# Patient Record
Sex: FEMALE | Race: ASIAN | NOT HISPANIC OR LATINO
[De-identification: names, ages, dates, MRNs, and addresses within clinical notes are randomized per-mention and may not be internally consistent; named-entity substitution may affect disease eponyms.]

---

## 2017-05-23 ENCOUNTER — APPOINTMENT (OUTPATIENT)
Dept: INTERNAL MEDICINE | Facility: CLINIC | Age: 42
End: 2017-05-23

## 2017-05-23 VITALS
HEART RATE: 75 BPM | BODY MASS INDEX: 26.8 KG/M2 | TEMPERATURE: 98.3 F | DIASTOLIC BLOOD PRESSURE: 70 MMHG | OXYGEN SATURATION: 99 % | WEIGHT: 157 LBS | SYSTOLIC BLOOD PRESSURE: 115 MMHG | HEIGHT: 64 IN

## 2017-05-23 DIAGNOSIS — O02.1 MISSED ABORTION: ICD-10-CM

## 2017-05-23 DIAGNOSIS — Z01.419 ENCOUNTER FOR GYNECOLOGICAL EXAMINATION (GENERAL) (ROUTINE) W/OUT ABNORMAL FINDINGS: ICD-10-CM

## 2017-05-24 LAB
ALBUMIN SERPL ELPH-MCNC: 4.4 G/DL
ALP BLD-CCNC: 50 U/L
ALT SERPL-CCNC: 20 U/L
ANION GAP SERPL CALC-SCNC: 13 MMOL/L
AST SERPL-CCNC: 15 U/L
BASOPHILS # BLD AUTO: 0.02 K/UL
BASOPHILS NFR BLD AUTO: 0.2 %
BILIRUB SERPL-MCNC: 0.9 MG/DL
BUN SERPL-MCNC: 16 MG/DL
CALCIUM SERPL-MCNC: 9.7 MG/DL
CHLORIDE SERPL-SCNC: 101 MMOL/L
CHOLEST SERPL-MCNC: 200 MG/DL
CHOLEST/HDLC SERPL: 3.4 RATIO
CO2 SERPL-SCNC: 24 MMOL/L
CREAT SERPL-MCNC: 0.72 MG/DL
EOSINOPHIL # BLD AUTO: 0.27 K/UL
EOSINOPHIL NFR BLD AUTO: 3.2 %
GLUCOSE SERPL-MCNC: 96 MG/DL
HBA1C MFR BLD HPLC: 5.3 %
HCT VFR BLD CALC: 38.8 %
HDLC SERPL-MCNC: 58 MG/DL
HGB BLD-MCNC: 12.4 G/DL
IMM GRANULOCYTES NFR BLD AUTO: 0.2 %
LDLC SERPL CALC-MCNC: 120 MG/DL
LYMPHOCYTES # BLD AUTO: 2.1 K/UL
LYMPHOCYTES NFR BLD AUTO: 25.1 %
MAN DIFF?: NORMAL
MCHC RBC-ENTMCNC: 25.6 PG
MCHC RBC-ENTMCNC: 32 GM/DL
MCV RBC AUTO: 80 FL
MONOCYTES # BLD AUTO: 0.35 K/UL
MONOCYTES NFR BLD AUTO: 4.2 %
NEUTROPHILS # BLD AUTO: 5.59 K/UL
NEUTROPHILS NFR BLD AUTO: 67.1 %
PLATELET # BLD AUTO: 274 K/UL
POTASSIUM SERPL-SCNC: 4.6 MMOL/L
PROT SERPL-MCNC: 7.7 G/DL
RBC # BLD: 4.85 M/UL
RBC # FLD: 12.9 %
SODIUM SERPL-SCNC: 138 MMOL/L
TRIGL SERPL-MCNC: 110 MG/DL
TSH SERPL-ACNC: 1.43 UIU/ML
WBC # FLD AUTO: 8.35 K/UL

## 2017-05-29 ENCOUNTER — FORM ENCOUNTER (OUTPATIENT)
Age: 42
End: 2017-05-29

## 2017-05-30 ENCOUNTER — APPOINTMENT (OUTPATIENT)
Dept: MAMMOGRAPHY | Facility: IMAGING CENTER | Age: 42
End: 2017-05-30

## 2017-05-30 ENCOUNTER — OUTPATIENT (OUTPATIENT)
Dept: OUTPATIENT SERVICES | Facility: HOSPITAL | Age: 42
LOS: 1 days | End: 2017-05-30
Payer: COMMERCIAL

## 2017-05-30 DIAGNOSIS — Z00.00 ENCOUNTER FOR GENERAL ADULT MEDICAL EXAMINATION WITHOUT ABNORMAL FINDINGS: ICD-10-CM

## 2017-05-30 PROCEDURE — 77067 SCR MAMMO BI INCL CAD: CPT

## 2017-05-30 PROCEDURE — 77063 BREAST TOMOSYNTHESIS BI: CPT

## 2019-10-22 ENCOUNTER — OTHER (OUTPATIENT)
Age: 44
End: 2019-10-22

## 2019-11-05 ENCOUNTER — FORM ENCOUNTER (OUTPATIENT)
Age: 44
End: 2019-11-05

## 2019-11-06 ENCOUNTER — OUTPATIENT (OUTPATIENT)
Dept: OUTPATIENT SERVICES | Facility: HOSPITAL | Age: 44
LOS: 1 days | End: 2019-11-06
Payer: COMMERCIAL

## 2019-11-06 ENCOUNTER — APPOINTMENT (OUTPATIENT)
Dept: MAMMOGRAPHY | Facility: IMAGING CENTER | Age: 44
End: 2019-11-06
Payer: COMMERCIAL

## 2019-11-06 DIAGNOSIS — Z00.00 ENCOUNTER FOR GENERAL ADULT MEDICAL EXAMINATION WITHOUT ABNORMAL FINDINGS: ICD-10-CM

## 2019-11-06 PROCEDURE — 77063 BREAST TOMOSYNTHESIS BI: CPT | Mod: 26

## 2019-11-06 PROCEDURE — 77063 BREAST TOMOSYNTHESIS BI: CPT

## 2019-11-06 PROCEDURE — 77067 SCR MAMMO BI INCL CAD: CPT | Mod: 26

## 2019-11-06 PROCEDURE — 77067 SCR MAMMO BI INCL CAD: CPT

## 2020-01-13 ENCOUNTER — APPOINTMENT (OUTPATIENT)
Dept: OBGYN | Facility: CLINIC | Age: 45
End: 2020-01-13
Payer: COMMERCIAL

## 2020-01-13 VITALS
DIASTOLIC BLOOD PRESSURE: 90 MMHG | HEART RATE: 87 BPM | WEIGHT: 167 LBS | BODY MASS INDEX: 28.51 KG/M2 | HEIGHT: 64 IN | SYSTOLIC BLOOD PRESSURE: 123 MMHG

## 2020-01-13 VITALS — SYSTOLIC BLOOD PRESSURE: 122 MMHG | DIASTOLIC BLOOD PRESSURE: 81 MMHG

## 2020-01-13 DIAGNOSIS — Z01.419 ENCOUNTER FOR GYNECOLOGICAL EXAMINATION (GENERAL) (ROUTINE) W/OUT ABNORMAL FINDINGS: ICD-10-CM

## 2020-01-13 PROCEDURE — 99386 PREV VISIT NEW AGE 40-64: CPT

## 2020-02-01 LAB
C TRACH RRNA SPEC QL NAA+PROBE: NOT DETECTED
CYTOLOGY CVX/VAG DOC THIN PREP: NORMAL
HPV HIGH+LOW RISK DNA PNL CVX: NOT DETECTED
N GONORRHOEA RRNA SPEC QL NAA+PROBE: NOT DETECTED
SOURCE AMPLIFICATION: NORMAL

## 2020-03-07 NOTE — PHYSICAL EXAM
[Awake] : awake [Alert] : alert [Soft] : soft [Oriented x3] : oriented to person, place, and time [Labia Majora] : labia major [Labia Minora] : labia minora [Normal] : clitoris [No Bleeding] : there was no active vaginal bleeding [Pap Obtained] : a Pap smear was performed [Uterine Adnexae] : were not tender and not enlarged [No Tenderness] : no rectal tenderness [Nl Sphincter Tone] : normal sphincter tone [RRR, No Murmurs] : RRR, no murmurs [CTAB] : CTAB [Acute Distress] : no acute distress [LAD] : no lymphadenopathy [Thyroid Nodule] : no thyroid nodule [Goiter] : no goiter [Mass] : no breast mass [Nipple Discharge] : no nipple discharge [Axillary LAD] : no axillary lymphadenopathy [Tender] : non tender [Distended] : not distended [H/Smegaly] : no hepatosplenomegaly [Depressed Mood] : not depressed [Flat Affect] : affect not flat [FreeTextEntry5] : HPV and cervical cultures obtained. [FreeTextEntry9] : Confirms vaginal exam.

## 2020-04-25 ENCOUNTER — MESSAGE (OUTPATIENT)
Age: 45
End: 2020-04-25

## 2020-05-04 LAB
SARS-COV-2 IGG SERPL IA-ACNC: <0.1 INDEX
SARS-COV-2 IGG SERPL QL IA: NEGATIVE

## 2020-05-05 ENCOUNTER — APPOINTMENT (OUTPATIENT)
Dept: OBGYN | Facility: CLINIC | Age: 45
End: 2020-05-05

## 2021-02-06 ENCOUNTER — RESULT REVIEW (OUTPATIENT)
Age: 46
End: 2021-02-06

## 2021-05-05 ENCOUNTER — APPOINTMENT (OUTPATIENT)
Dept: INTERNAL MEDICINE | Facility: CLINIC | Age: 46
End: 2021-05-05
Payer: COMMERCIAL

## 2021-05-05 VITALS
SYSTOLIC BLOOD PRESSURE: 115 MMHG | OXYGEN SATURATION: 98 % | HEART RATE: 86 BPM | HEIGHT: 64 IN | TEMPERATURE: 98.6 F | BODY MASS INDEX: 28.34 KG/M2 | WEIGHT: 166 LBS | DIASTOLIC BLOOD PRESSURE: 80 MMHG

## 2021-05-05 PROCEDURE — 99396 PREV VISIT EST AGE 40-64: CPT | Mod: 25

## 2021-05-05 PROCEDURE — 99072 ADDL SUPL MATRL&STAF TM PHE: CPT

## 2021-05-05 PROCEDURE — 36415 COLL VENOUS BLD VENIPUNCTURE: CPT

## 2021-05-05 NOTE — PHYSICAL EXAM
[No Acute Distress] : no acute distress [Well-Appearing] : well-appearing [Normal Sclera/Conjunctiva] : normal sclera/conjunctiva [No Lymphadenopathy] : no lymphadenopathy [Supple] : supple [No Respiratory Distress] : no respiratory distress  [Clear to Auscultation] : lungs were clear to auscultation bilaterally [Normal Rate] : normal rate  [Regular Rhythm] : with a regular rhythm [Normal S1, S2] : normal S1 and S2 [No Murmur] : no murmur heard [No Edema] : there was no peripheral edema [Soft] : abdomen soft [Non Tender] : non-tender [Normal Supraclavicular Nodes] : no supraclavicular lymphadenopathy [Normal Anterior Cervical Nodes] : no anterior cervical lymphadenopathy [Normal Affect] : the affect was normal [Normal Mood] : the mood was normal

## 2021-05-05 NOTE — HISTORY OF PRESENT ILLNESS
[de-identified] : 44 y/o female for CPE, last seen here in 2017.  \par \par Overall healthy and without complaints.  \par \par \par HCM:\par PAP Jan 2020\par Mammo 11/2019\par COVID vaccines completed

## 2021-05-05 NOTE — ASSESSMENT
[FreeTextEntry1] : Healthy 46 y/o female\par \par \par \par HCM:\par PAP Jan 2020\par Mammo 11/2019 DUE, ordered\par COVID vaccines completed\par Metabolic screening labs ordered

## 2021-05-06 LAB
ALBUMIN SERPL ELPH-MCNC: 4.5 G/DL
ALP BLD-CCNC: 62 U/L
ALT SERPL-CCNC: 12 U/L
ANION GAP SERPL CALC-SCNC: 10 MMOL/L
AST SERPL-CCNC: 17 U/L
BASOPHILS # BLD AUTO: 0.03 K/UL
BASOPHILS NFR BLD AUTO: 0.4 %
BILIRUB SERPL-MCNC: 0.5 MG/DL
BUN SERPL-MCNC: 16 MG/DL
CALCIUM SERPL-MCNC: 9.4 MG/DL
CHLORIDE SERPL-SCNC: 107 MMOL/L
CHOLEST SERPL-MCNC: 202 MG/DL
CO2 SERPL-SCNC: 24 MMOL/L
CREAT SERPL-MCNC: 0.86 MG/DL
EOSINOPHIL # BLD AUTO: 0.21 K/UL
EOSINOPHIL NFR BLD AUTO: 3.1 %
ESTIMATED AVERAGE GLUCOSE: 103 MG/DL
GLUCOSE SERPL-MCNC: 95 MG/DL
HBA1C MFR BLD HPLC: 5.2 %
HCT VFR BLD CALC: 38 %
HDLC SERPL-MCNC: 56 MG/DL
HGB BLD-MCNC: 12.2 G/DL
IMM GRANULOCYTES NFR BLD AUTO: 0.3 %
LDLC SERPL CALC-MCNC: 130 MG/DL
LYMPHOCYTES # BLD AUTO: 1.86 K/UL
LYMPHOCYTES NFR BLD AUTO: 27.3 %
MAN DIFF?: NORMAL
MCHC RBC-ENTMCNC: 26.5 PG
MCHC RBC-ENTMCNC: 32.1 GM/DL
MCV RBC AUTO: 82.4 FL
MONOCYTES # BLD AUTO: 0.39 K/UL
MONOCYTES NFR BLD AUTO: 5.7 %
NEUTROPHILS # BLD AUTO: 4.31 K/UL
NEUTROPHILS NFR BLD AUTO: 63.2 %
NONHDLC SERPL-MCNC: 147 MG/DL
PLATELET # BLD AUTO: 269 K/UL
POTASSIUM SERPL-SCNC: 4.4 MMOL/L
PROT SERPL-MCNC: 7.1 G/DL
RBC # BLD: 4.61 M/UL
RBC # FLD: 12.8 %
SODIUM SERPL-SCNC: 141 MMOL/L
TRIGL SERPL-MCNC: 82 MG/DL
TSH SERPL-ACNC: 1.07 UIU/ML
WBC # FLD AUTO: 6.82 K/UL

## 2021-07-06 ENCOUNTER — LABORATORY RESULT (OUTPATIENT)
Age: 46
End: 2021-07-06

## 2021-11-17 ENCOUNTER — APPOINTMENT (OUTPATIENT)
Dept: MAMMOGRAPHY | Facility: IMAGING CENTER | Age: 46
End: 2021-11-17
Payer: COMMERCIAL

## 2021-11-17 ENCOUNTER — APPOINTMENT (OUTPATIENT)
Dept: ULTRASOUND IMAGING | Facility: IMAGING CENTER | Age: 46
End: 2021-11-17
Payer: COMMERCIAL

## 2021-11-17 ENCOUNTER — OUTPATIENT (OUTPATIENT)
Dept: OUTPATIENT SERVICES | Facility: HOSPITAL | Age: 46
LOS: 1 days | End: 2021-11-17
Payer: COMMERCIAL

## 2021-11-17 ENCOUNTER — RESULT REVIEW (OUTPATIENT)
Age: 46
End: 2021-11-17

## 2021-11-17 ENCOUNTER — APPOINTMENT (OUTPATIENT)
Dept: ULTRASOUND IMAGING | Facility: IMAGING CENTER | Age: 46
End: 2021-11-17

## 2021-11-17 ENCOUNTER — APPOINTMENT (OUTPATIENT)
Dept: MAMMOGRAPHY | Facility: IMAGING CENTER | Age: 46
End: 2021-11-17

## 2021-11-17 DIAGNOSIS — Z00.00 ENCOUNTER FOR GENERAL ADULT MEDICAL EXAMINATION WITHOUT ABNORMAL FINDINGS: ICD-10-CM

## 2021-11-17 DIAGNOSIS — Z00.8 ENCOUNTER FOR OTHER GENERAL EXAMINATION: ICD-10-CM

## 2021-11-17 PROCEDURE — 77063 BREAST TOMOSYNTHESIS BI: CPT | Mod: 26

## 2021-11-17 PROCEDURE — 77063 BREAST TOMOSYNTHESIS BI: CPT

## 2021-11-17 PROCEDURE — 77067 SCR MAMMO BI INCL CAD: CPT | Mod: 26

## 2021-11-17 PROCEDURE — 77067 SCR MAMMO BI INCL CAD: CPT

## 2021-12-18 ENCOUNTER — OUTPATIENT (OUTPATIENT)
Dept: OUTPATIENT SERVICES | Facility: HOSPITAL | Age: 46
LOS: 1 days | End: 2021-12-18

## 2021-12-18 DIAGNOSIS — U07.1 COVID-19: ICD-10-CM

## 2022-11-16 ENCOUNTER — APPOINTMENT (OUTPATIENT)
Dept: MAMMOGRAPHY | Facility: IMAGING CENTER | Age: 47
End: 2022-11-16

## 2022-11-16 ENCOUNTER — APPOINTMENT (OUTPATIENT)
Dept: ULTRASOUND IMAGING | Facility: IMAGING CENTER | Age: 47
End: 2022-11-16

## 2022-11-16 ENCOUNTER — RESULT REVIEW (OUTPATIENT)
Age: 47
End: 2022-11-16

## 2022-11-16 ENCOUNTER — OUTPATIENT (OUTPATIENT)
Dept: OUTPATIENT SERVICES | Facility: HOSPITAL | Age: 47
LOS: 1 days | End: 2022-11-16
Payer: COMMERCIAL

## 2022-11-16 DIAGNOSIS — Z00.8 ENCOUNTER FOR OTHER GENERAL EXAMINATION: ICD-10-CM

## 2022-11-16 PROCEDURE — 77063 BREAST TOMOSYNTHESIS BI: CPT | Mod: 26

## 2022-11-16 PROCEDURE — 77067 SCR MAMMO BI INCL CAD: CPT | Mod: 26

## 2022-11-16 PROCEDURE — 77063 BREAST TOMOSYNTHESIS BI: CPT

## 2022-11-16 PROCEDURE — 77067 SCR MAMMO BI INCL CAD: CPT

## 2023-02-01 ENCOUNTER — APPOINTMENT (OUTPATIENT)
Dept: INTERNAL MEDICINE | Facility: CLINIC | Age: 48
End: 2023-02-01

## 2023-06-12 ENCOUNTER — NON-APPOINTMENT (OUTPATIENT)
Age: 48
End: 2023-06-12

## 2023-06-12 ENCOUNTER — APPOINTMENT (OUTPATIENT)
Dept: BARIATRICS | Facility: CLINIC | Age: 48
End: 2023-06-12
Payer: COMMERCIAL

## 2023-06-12 VITALS
SYSTOLIC BLOOD PRESSURE: 124 MMHG | DIASTOLIC BLOOD PRESSURE: 80 MMHG | OXYGEN SATURATION: 99 % | WEIGHT: 168 LBS | HEART RATE: 89 BPM | HEIGHT: 63 IN | BODY MASS INDEX: 29.77 KG/M2 | TEMPERATURE: 97.1 F

## 2023-06-12 PROCEDURE — 99205 OFFICE O/P NEW HI 60 MIN: CPT

## 2023-06-12 RX ORDER — PEDI MULTIVIT NO.25/FOLIC ACID 300 MCG
60 TABLET,CHEWABLE ORAL
Refills: 0 | Status: DISCONTINUED | COMMUNITY
Start: 2017-05-23 | End: 2023-06-12

## 2023-06-13 ENCOUNTER — TRANSCRIPTION ENCOUNTER (OUTPATIENT)
Age: 48
End: 2023-06-13

## 2023-06-19 LAB
25(OH)D3 SERPL-MCNC: 44.1 NG/ML
ALBUMIN SERPL ELPH-MCNC: 4.6 G/DL
ALP BLD-CCNC: 52 U/L
ALT SERPL-CCNC: 12 U/L
ANION GAP SERPL CALC-SCNC: 13 MMOL/L
APTT BLD: 30.7 SEC
AST SERPL-CCNC: 15 U/L
BILIRUB SERPL-MCNC: 1.1 MG/DL
BUN SERPL-MCNC: 14 MG/DL
CALCIUM SERPL-MCNC: 9.4 MG/DL
CHLORIDE SERPL-SCNC: 104 MMOL/L
CHOLEST SERPL-MCNC: 223 MG/DL
CO2 SERPL-SCNC: 23 MMOL/L
CREAT SERPL-MCNC: 0.78 MG/DL
EGFR: 94 ML/MIN/1.73M2
ESTIMATED AVERAGE GLUCOSE: 108 MG/DL
FERRITIN SERPL-MCNC: 21 NG/ML
FOLATE SERPL-MCNC: >20 NG/ML
GLUCOSE SERPL-MCNC: 92 MG/DL
HBA1C MFR BLD HPLC: 5.4 %
HDLC SERPL-MCNC: 53 MG/DL
INR PPP: 1.02 RATIO
INSULIN P FAST SERPL-ACNC: 7.3 UU/ML
IRON SERPL-MCNC: 181 UG/DL
LDLC SERPL CALC-MCNC: 144 MG/DL
NONHDLC SERPL-MCNC: 170 MG/DL
POTASSIUM SERPL-SCNC: 4.1 MMOL/L
PROT SERPL-MCNC: 7.3 G/DL
PT BLD: 11.9 SEC
SODIUM SERPL-SCNC: 140 MMOL/L
TRIGL SERPL-MCNC: 128 MG/DL
TSH SERPL-ACNC: 1.73 UIU/ML
VIT A SERPL-MCNC: 46.2 UG/DL
VIT B1 SERPL-MCNC: 111.4 NMOL/L
VIT B12 SERPL-MCNC: 906 PG/ML
VIT B6 SERPL-MCNC: 10 UG/L
ZINC SERPL-MCNC: 97 UG/DL

## 2023-06-19 RX ORDER — CHOLECALCIFEROL (VITAMIN D3) 25 MCG
TABLET,CHEWABLE ORAL DAILY
Refills: 0 | Status: ACTIVE | COMMUNITY

## 2023-06-19 RX ORDER — FOLIC ACID/MULTIVIT,IRON,MINER .4-18-35
TABLET,CHEWABLE ORAL DAILY
Refills: 0 | Status: ACTIVE | COMMUNITY

## 2023-06-28 ENCOUNTER — APPOINTMENT (OUTPATIENT)
Dept: BARIATRICS/WEIGHT MGMT | Facility: CLINIC | Age: 48
End: 2023-06-28
Payer: COMMERCIAL

## 2023-06-28 ENCOUNTER — APPOINTMENT (OUTPATIENT)
Dept: INTERNAL MEDICINE | Facility: CLINIC | Age: 48
End: 2023-06-28
Payer: COMMERCIAL

## 2023-06-28 VITALS — BODY MASS INDEX: 29.77 KG/M2 | HEIGHT: 63 IN | WEIGHT: 168 LBS

## 2023-06-28 PROCEDURE — 97802 MEDICAL NUTRITION INDIV IN: CPT | Mod: 95

## 2023-06-29 DIAGNOSIS — R63.8 OTHER SYMPTOMS AND SIGNS CONCERNING FOOD AND FLUID INTAKE: ICD-10-CM

## 2023-06-30 ENCOUNTER — APPOINTMENT (OUTPATIENT)
Dept: INTERNAL MEDICINE | Facility: CLINIC | Age: 48
End: 2023-06-30
Payer: COMMERCIAL

## 2023-06-30 VITALS
BODY MASS INDEX: 28.53 KG/M2 | WEIGHT: 161 LBS | RESPIRATION RATE: 17 BRPM | SYSTOLIC BLOOD PRESSURE: 109 MMHG | OXYGEN SATURATION: 97 % | HEIGHT: 63 IN | DIASTOLIC BLOOD PRESSURE: 74 MMHG | HEART RATE: 89 BPM

## 2023-06-30 DIAGNOSIS — Z01.818 ENCOUNTER FOR OTHER PREPROCEDURAL EXAMINATION: ICD-10-CM

## 2023-06-30 DIAGNOSIS — Z00.00 ENCOUNTER FOR GENERAL ADULT MEDICAL EXAMINATION W/OUT ABNORMAL FINDINGS: ICD-10-CM

## 2023-06-30 DIAGNOSIS — E78.5 HYPERLIPIDEMIA, UNSPECIFIED: ICD-10-CM

## 2023-06-30 DIAGNOSIS — E66.9 OBESITY, UNSPECIFIED: ICD-10-CM

## 2023-06-30 DIAGNOSIS — Z86.39 PERSONAL HISTORY OF OTHER ENDOCRINE, NUTRITIONAL AND METABOLIC DISEASE: ICD-10-CM

## 2023-06-30 PROCEDURE — 99396 PREV VISIT EST AGE 40-64: CPT

## 2023-06-30 NOTE — HISTORY OF PRESENT ILLNESS
[de-identified] : 47 y/o female for CPE\par \par Recent visit to bariatric surgery for weight loss counseling\par recent metabolic labs reviewed\par Exercises regularly, eating more healthier, has lost 7 lbs!

## 2023-06-30 NOTE — ASSESSMENT
[FreeTextEntry1] : Healthy 47 y/o female\par \par Overweight:\par Normal TSH, A1c on recent labs, \par Exercises regular, has already enacted diet changes with some weight loss!\par \par HCM:\par **Overdue for CRC screening, prefers FIT, kit provided\par PAP Jan 2020, will schedule appointment\par Mammo 11/2022, ordered for fall 2023\par COVID vaccines completed\par Metabolic screening labs reviewed

## 2023-06-30 NOTE — PHYSICAL EXAM
[No Acute Distress] : no acute distress [Normal Sclera/Conjunctiva] : normal sclera/conjunctiva [No Respiratory Distress] : no respiratory distress  [Clear to Auscultation] : lungs were clear to auscultation bilaterally [Normal Rate] : normal rate  [Regular Rhythm] : with a regular rhythm [Normal S1, S2] : normal S1 and S2 [No Murmur] : no murmur heard [Soft] : abdomen soft [No Edema] : there was no peripheral edema [Non Tender] : non-tender

## 2023-07-18 ENCOUNTER — APPOINTMENT (OUTPATIENT)
Dept: BARIATRICS | Facility: CLINIC | Age: 48
End: 2023-07-18
Payer: COMMERCIAL

## 2023-07-18 VITALS
WEIGHT: 163 LBS | HEART RATE: 81 BPM | SYSTOLIC BLOOD PRESSURE: 120 MMHG | HEIGHT: 63 IN | DIASTOLIC BLOOD PRESSURE: 72 MMHG | BODY MASS INDEX: 28.88 KG/M2 | TEMPERATURE: 97 F | OXYGEN SATURATION: 99 %

## 2023-07-18 PROCEDURE — 99214 OFFICE O/P EST MOD 30 MIN: CPT

## 2023-07-18 RX ORDER — SEMAGLUTIDE 0.25 MG/.5ML
0.25 INJECTION, SOLUTION SUBCUTANEOUS
Qty: 1 | Refills: 0 | Status: DISCONTINUED | COMMUNITY
Start: 2023-06-19 | End: 2023-07-18

## 2023-07-18 NOTE — REASON FOR VISIT
[Follow-Up Visit] : a follow-up visit for [Morbid Obesity (BMI<40)] : morbid obesity (bmi<40) [Spouse] : spouse

## 2023-07-20 NOTE — ASSESSMENT
[FreeTextEntry1] : ZULAY SINGH is a 48 year-old F with a long-standing h/o obesity, who presents today for initial evaluation for weight management options. \par \par I had a lengthy discussion with the patient regarding nutrition, exercise, weight loss medications, and bariatric surgery. The patient qualifies for and is most interested in weight loss medications. \par \par Health maintenance and behavioral/nutrition counseling for obesity: An additional 30 minutes of the visit was spent counseling the patient regarding need for aggressive weight loss and behavior modification including nutrition and proper eating habits, including which foods to eat and avoid. \par \par I emphasized the importance of making healthier food choices including fresh fruits and vegetables, lean meats, and protein sources. I recommended front loading calories, incorporating whole grains, and eliminating fast foods. I also discussed the importance of avoiding fried/fatty foods and foods containing high sugar content including juices/shakes/sodas/desserts. \par \par I also encouraged beginning an exercise program and recommended cardiovascular exercises along with strength training to build lean muscle. I made suggestions on different types of exercises to try. \par \par Patient will work on the following: \par \par -Meet with nutritionist \par \par -Eliminate snacks \par \par -Focus on eating 3 well-balanced meals during the daytime with appropriate portion size \par \par -Cooking fresh meals rather than take out/processed/ready-made foods \par \par -Incorporating exercise \par \par  I have discussed initiating wegovy therapy for pt. All risks and benefits have been discussed with the patient. Risks include but are not limited to nausea, vomiting, constipation, diarrhea and GI upset. Contraindications include pancreatitis, MENS type 2 and medullary thyroid cancer, and pregnancy. Pt verbalizes understanding and wishes to proceed with medical therapy. Instructions for use provided via office demonstration.  \par \par  Start Wegovy 0.25 mg based on authorization and tolerance.  \par \par Patient educated to call with questions/concerns \par \par All questions answered \par \par Return to office in 1 month \par \par \par Additional time spent before and after visit reviewing chart.

## 2023-07-20 NOTE — HISTORY OF PRESENT ILLNESS
[de-identified] : ZULAY SINGH is a 48 year old F with a long-standing h/o obesity, who presents today for initial evaluation for weight management options.\par \par Heaviest/current wt 181 lbs, lowest wt 129 lb. Goal weight: 140\par Diets/exercise programs tried in the past: Keto and walking\par Weight loss medications tried in the past: none\par Reason for stopping weight loss medication if applicable: N/A\par  \par History of bariatric surgery: None\par Obesity comorbidities: None\par Comorbidities improved/resolved: N/A\par Anti-obesity medication: none\par Obesity medication side effects: N/A\par  \par No abdominal pain, reflux, nausea, vomiting, constipation or diarrhea.\par \par Current dietary lifestyle:  no meal prep eats [employer], Pizza, and [employer]\par Breakfast: chicken\par Lunch: chicken\par Dinner: chicken\par Snacks: salty and sweet\par Drinks: water and iced coffee\par  \par Activity Lifestyle:\par Sleep: 6-7 hrs/night\par Physical activity/exercise: walking\par Work: works in [employer]\par Lives with: \par Smoking/ETOH: No\par  \par Obstacles to losing weight:\par \par

## 2023-07-20 NOTE — PHYSICAL EXAM
[Overweight] : overweight  [Normal] : affect appropriate [de-identified] : soft, NT, ND, no evidence of hernia or diastasis

## 2023-08-10 ENCOUNTER — APPOINTMENT (OUTPATIENT)
Dept: BARIATRICS/WEIGHT MGMT | Facility: CLINIC | Age: 48
End: 2023-08-10

## 2023-08-24 ENCOUNTER — APPOINTMENT (OUTPATIENT)
Dept: BARIATRICS | Facility: CLINIC | Age: 48
End: 2023-08-24
Payer: COMMERCIAL

## 2023-08-24 VITALS — BODY MASS INDEX: 28.17 KG/M2 | HEIGHT: 63 IN | WEIGHT: 159 LBS

## 2023-08-24 PROCEDURE — 99214 OFFICE O/P EST MOD 30 MIN: CPT | Mod: 95

## 2023-08-24 NOTE — ASSESSMENT
[FreeTextEntry1] : 48 year old woman presents for a weight-loss medication follow-up. Patient is doing well; lost 4 lbs since last visit; pt is on Wegovy 0.5 mg/week; reports some constipation. Nutrition and exercise guidelines reviewed with the patient.   Continue 3 protein-focused meals/day (aim for 60 g - 70 g protein/day) Encourage zero calorie fluid intake (64 oz/day) Take MiraLAX and Colace, as needed, for constipation Exercise with cardio (aim for 8k-10k steps/day), and strength training 2x/week Use step counter Weigh yourself 1x-2x/week Try the Calm otto or Soothing Pod otto for stress management Follow-up with nutritionist  Follow-up 4 weeks after starting Wegovy 1 mg/week; call the office right away with any side effects All questions answered  Call with any questions or concerns  Time before and after visit spent reviewing chart

## 2023-08-24 NOTE — HISTORY OF PRESENT ILLNESS
[de-identified] : 48 year old woman presents via telemedicine for a weight-loss medication follow-up. Patient is doing well; lost 4 lbs since last visit; pt is on Wegovy 0.5 mg/week; reports some constipation. Reports no abdominal pain, nausea/vomiting, diarrhea, reflux/heartburn. Patient eating 3 protein-rich meals/day, drinking adequate zero-calorie fluids/day, and exercising.   History of bariatric surgery: None Obesity comorbidities: None Comorbidities improved/resolved: N/A Anti-obesity medication: Wegovy 0.5 mg/week Obesity medication side effects: constipation    History of bariatric surgery: None Obesity comorbidities: None Comorbidities improved/resolved: N/A Anti-obesity medication: Wegovy 0.5 mg/week Obesity medication side effects: constipation

## 2023-08-24 NOTE — REVIEW OF SYSTEMS
[Patient Intake Form Reviewed] : Patient intake form was reviewed [Constipation] : constipation [Abdominal Pain] : no abdominal pain [Vomiting] : no vomiting [Diarrhea] : no diarrhea [Reflux/Heartburn] : no reflux/ heartburn [Hernia] : no hernia [Confused] : no confusion [Dizziness] : no dizziness [Fainting] : no fainting [Difficulty Walking] : no difficulty walking [Negative] : Neurological

## 2023-10-06 ENCOUNTER — APPOINTMENT (OUTPATIENT)
Dept: BARIATRICS | Facility: CLINIC | Age: 48
End: 2023-10-06
Payer: COMMERCIAL

## 2023-10-06 VITALS — BODY MASS INDEX: 26.75 KG/M2 | HEIGHT: 63 IN | WEIGHT: 151 LBS

## 2023-10-06 PROCEDURE — 99214 OFFICE O/P EST MOD 30 MIN: CPT | Mod: 95

## 2023-10-06 RX ORDER — SEMAGLUTIDE 0.5 MG/.5ML
0.5 INJECTION, SOLUTION SUBCUTANEOUS
Qty: 1 | Refills: 0 | Status: DISCONTINUED | COMMUNITY
Start: 2023-07-18 | End: 2023-10-06

## 2023-10-06 RX ORDER — SEMAGLUTIDE 1 MG/.5ML
1 INJECTION, SOLUTION SUBCUTANEOUS
Qty: 1 | Refills: 0 | Status: DISCONTINUED | COMMUNITY
Start: 2023-08-11 | End: 2023-10-06

## 2023-10-25 RX ORDER — MULTIVITAMIN
CAPSULE ORAL DAILY
Refills: 0 | Status: DISCONTINUED | COMMUNITY
End: 2023-10-25

## 2023-10-27 ENCOUNTER — APPOINTMENT (OUTPATIENT)
Dept: BARIATRICS | Facility: CLINIC | Age: 48
End: 2023-10-27
Payer: COMMERCIAL

## 2023-10-27 VITALS — HEIGHT: 63 IN | WEIGHT: 149 LBS | BODY MASS INDEX: 26.4 KG/M2

## 2023-10-27 DIAGNOSIS — R51.9 HEADACHE, UNSPECIFIED: ICD-10-CM

## 2023-10-27 PROCEDURE — 99214 OFFICE O/P EST MOD 30 MIN: CPT | Mod: 95

## 2023-10-27 RX ORDER — SEMAGLUTIDE 1.7 MG/.75ML
1.7 INJECTION, SOLUTION SUBCUTANEOUS
Qty: 3 | Refills: 3 | Status: DISCONTINUED | COMMUNITY
Start: 2023-10-06 | End: 2023-10-27

## 2023-11-06 ENCOUNTER — APPOINTMENT (OUTPATIENT)
Dept: ULTRASOUND IMAGING | Facility: IMAGING CENTER | Age: 48
End: 2023-11-06
Payer: COMMERCIAL

## 2023-11-06 ENCOUNTER — RESULT REVIEW (OUTPATIENT)
Age: 48
End: 2023-11-06

## 2023-11-06 ENCOUNTER — APPOINTMENT (OUTPATIENT)
Dept: MAMMOGRAPHY | Facility: IMAGING CENTER | Age: 48
End: 2023-11-06
Payer: COMMERCIAL

## 2023-11-06 PROCEDURE — 77063 BREAST TOMOSYNTHESIS BI: CPT | Mod: 26

## 2023-11-06 PROCEDURE — 77067 SCR MAMMO BI INCL CAD: CPT | Mod: 26

## 2023-11-07 PROBLEM — R51.9 GENERALIZED HEADACHES: Status: ACTIVE | Noted: 2023-11-07

## 2023-12-14 NOTE — REVIEW OF SYSTEMS
[Confused] : no confusion [Dizziness] : no dizziness [Fainting] : no fainting [Difficulty Walking] : no difficulty walking [de-identified] : headaches

## 2023-12-14 NOTE — ASSESSMENT
[FreeTextEntry1] : 48 year old woman with a longstanding history of obesity presents for a weight management follow-up. 2 lb weight loss since last visit 3 weeks ago; pt on Wegovy 1.7 mg; reports headaches; drinking only 32 oz water/day. Will lower dose of Wegovy to 1 mg/week. Nutrition and exercise guidelines reviewed with the patient.    Continue 3 protein-focused meals/day (aim for 60 g - 70 g protein/day) Increase zero calorie fluid intake (64 oz/day) Exercise with cardio (aim for 8k-10k steps/day), and strength training 2x/week Use step counter Weigh yourself 1x-2x/week Try the Calm otto or Soothing Pod otto for stress management Follow-up with nutritionist (keep a food log) Start Wegovy 1 mg/week; call the office right away with any side effects Follow-up in 2 months All questions answered   Call with any questions or concerns   Time before and after visit spent reviewing chart

## 2023-12-14 NOTE — HISTORY OF PRESENT ILLNESS
[Home] : at home, [unfilled] , at the time of the visit. [Medical Office: ([facility])___] : at the medical office located in  [Verbal consent obtained from patient] : the patient, [unfilled] [de-identified] : 48 year old woman with a longstanding history of obesity presents for a weight management follow-up. 2 lb weight loss since last visit 3 weeks ago; pt on Wegovy 1.7 mg; reports headaches. Reports no abdominal pain, nausea/vomiting, constipation, diarrhea, reflux/heartburn. Patient eating 3 protein-rich meals/day, is trying to drink adequate zero-calorie fluids/day (reports drinks 32 oz water/day) and walking 10k steps 4x/day.    History of bariatric surgery: None Obesity comorbidities: None Comorbidities improved/resolved: N/A Anti-obesity medication: Wegovy 1.7 mg Obesity medication side effects: Headaches   Last nutritionist appt was 6/28/23

## 2024-02-13 ENCOUNTER — APPOINTMENT (OUTPATIENT)
Dept: BARIATRICS | Facility: CLINIC | Age: 49
End: 2024-02-13

## 2024-03-05 ENCOUNTER — APPOINTMENT (OUTPATIENT)
Dept: BARIATRICS | Facility: CLINIC | Age: 49
End: 2024-03-05
Payer: COMMERCIAL

## 2024-03-05 VITALS
TEMPERATURE: 96.7 F | HEIGHT: 63 IN | SYSTOLIC BLOOD PRESSURE: 114 MMHG | OXYGEN SATURATION: 99 % | HEART RATE: 82 BPM | WEIGHT: 150 LBS | DIASTOLIC BLOOD PRESSURE: 76 MMHG | BODY MASS INDEX: 26.58 KG/M2

## 2024-03-05 PROCEDURE — 99214 OFFICE O/P EST MOD 30 MIN: CPT

## 2024-03-07 NOTE — HISTORY OF PRESENT ILLNESS
[de-identified] : 48-year-old woman with a longstanding history of obesity presents for a weight management follow-up. 1 lb weight gain since last visit; on Wegovy 1 mg; reports no side effects.  Patient had headaches on 1.7 mg dose resolved on 1 mg dose.  Reports no abdominal pain, nausea/vomiting, constipation, diarrhea, reflux/heartburn. Patient eating 3 protein-rich meals/day, drinking adequate zero-calorie fluids/day, and exercising.  Beginning strength training program.  Anti-Obesity Medication(s): Wegovy Start Date: 10/27/23 Current Dose: Wegovy 1 mg Weight at Initial Consult: 168 lbs Current Weight: 150 lbs Side-Effects from Anti-Obesity Medication(s): None Obesity Comorbidities: N/A Comorbidities Improved/Resolved: N/A History of Bariatric Surgery: No

## 2024-03-07 NOTE — ASSESSMENT
[FreeTextEntry1] : 48-year-old woman with a longstanding history of obesity presents for a weight management follow-up. 1 lb weight gain since last visit; on Wegovy 1 mg; reports no side effects.  Would like to stay on 1 mg dose as had headaches on 1.7 mg.   Continue 3 protein-focused meals/day (aim for 60 g - 70 g protein/day) Encourage zero calorie fluid intake (64 oz/day) Exercise with cardio (aim for 8k-10k steps/day) and strength training 2-3x/week Use step counter Weigh yourself 1x-2x/week Try the Calm otto or Soothing Pod otto for stress management Follow-up with nutritionist (keep a food log) Continue Wegovy 1 mg/week; call the office right away with any side effects. Discussed with the pt the importance of avoiding pregnancy while on Wegovy. Follow-up in ~3 weeks All questions answered   Call with any questions or concerns   Time before and after visit spent reviewing chart

## 2024-03-07 NOTE — PHYSICAL EXAM
[Normal] : normal appearance, no rash, nodules, vesicles, ulcers, erythema [Overweight] : overweight  [de-identified] : soft, NT, ND, no evidence of hernia or diastasis; overweight

## 2024-03-11 RX ORDER — SEMAGLUTIDE 1.7 MG/.75ML
1.7 INJECTION, SOLUTION SUBCUTANEOUS
Qty: 1 | Refills: 0 | Status: ACTIVE | COMMUNITY
Start: 2023-10-27 | End: 1900-01-01

## 2024-03-13 ENCOUNTER — TRANSCRIPTION ENCOUNTER (OUTPATIENT)
Age: 49
End: 2024-03-13

## 2024-03-15 ENCOUNTER — APPOINTMENT (OUTPATIENT)
Dept: INTERNAL MEDICINE | Facility: CLINIC | Age: 49
End: 2024-03-15

## 2024-03-15 ENCOUNTER — OUTPATIENT (OUTPATIENT)
Dept: OUTPATIENT SERVICES | Facility: HOSPITAL | Age: 49
LOS: 1 days | End: 2024-03-15

## 2024-03-18 ENCOUNTER — NON-APPOINTMENT (OUTPATIENT)
Age: 49
End: 2024-03-18

## 2024-05-17 ENCOUNTER — APPOINTMENT (OUTPATIENT)
Dept: OBGYN | Facility: CLINIC | Age: 49
End: 2024-05-17

## 2024-05-17 ENCOUNTER — TRANSCRIPTION ENCOUNTER (OUTPATIENT)
Age: 49
End: 2024-05-17

## 2024-06-12 ENCOUNTER — TRANSCRIPTION ENCOUNTER (OUTPATIENT)
Age: 49
End: 2024-06-12

## 2024-06-28 ENCOUNTER — APPOINTMENT (OUTPATIENT)
Dept: OBGYN | Facility: CLINIC | Age: 49
End: 2024-06-28

## 2024-07-17 ENCOUNTER — NON-APPOINTMENT (OUTPATIENT)
Age: 49
End: 2024-07-17

## 2024-07-18 ENCOUNTER — APPOINTMENT (OUTPATIENT)
Dept: INTERNAL MEDICINE | Facility: CLINIC | Age: 49
End: 2024-07-18
Payer: COMMERCIAL

## 2024-07-18 VITALS — SYSTOLIC BLOOD PRESSURE: 118 MMHG | DIASTOLIC BLOOD PRESSURE: 72 MMHG

## 2024-07-18 VITALS
HEIGHT: 63 IN | HEART RATE: 81 BPM | DIASTOLIC BLOOD PRESSURE: 86 MMHG | SYSTOLIC BLOOD PRESSURE: 127 MMHG | WEIGHT: 148 LBS | BODY MASS INDEX: 26.22 KG/M2 | TEMPERATURE: 98 F | OXYGEN SATURATION: 100 %

## 2024-07-18 DIAGNOSIS — R63.8 OTHER SYMPTOMS AND SIGNS CONCERNING FOOD AND FLUID INTAKE: ICD-10-CM

## 2024-07-18 DIAGNOSIS — Z00.00 ENCOUNTER FOR GENERAL ADULT MEDICAL EXAMINATION W/OUT ABNORMAL FINDINGS: ICD-10-CM

## 2024-07-18 PROCEDURE — 99396 PREV VISIT EST AGE 40-64: CPT

## 2024-07-18 PROCEDURE — 36415 COLL VENOUS BLD VENIPUNCTURE: CPT

## 2024-07-19 ENCOUNTER — TRANSCRIPTION ENCOUNTER (OUTPATIENT)
Age: 49
End: 2024-07-19

## 2024-07-19 LAB
ALBUMIN SERPL ELPH-MCNC: 4.8 G/DL
ALP BLD-CCNC: 65 U/L
ALT SERPL-CCNC: 15 U/L
ANION GAP SERPL CALC-SCNC: 10 MMOL/L
AST SERPL-CCNC: 16 U/L
BASOPHILS # BLD AUTO: 0.03 K/UL
BASOPHILS NFR BLD AUTO: 0.4 %
BILIRUB SERPL-MCNC: 0.7 MG/DL
BUN SERPL-MCNC: 17 MG/DL
CALCIUM SERPL-MCNC: 9 MG/DL
CHLORIDE SERPL-SCNC: 104 MMOL/L
CHOLEST SERPL-MCNC: 255 MG/DL
CO2 SERPL-SCNC: 25 MMOL/L
CREAT SERPL-MCNC: 0.73 MG/DL
EGFR: 101 ML/MIN/1.73M2
EOSINOPHIL # BLD AUTO: 0.14 K/UL
EOSINOPHIL NFR BLD AUTO: 1.7 %
GLUCOSE SERPL-MCNC: 82 MG/DL
HCT VFR BLD CALC: 40.5 %
HDLC SERPL-MCNC: 75 MG/DL
HGB BLD-MCNC: 11.9 G/DL
IMM GRANULOCYTES NFR BLD AUTO: 0.2 %
LDLC SERPL CALC-MCNC: 159 MG/DL
LYMPHOCYTES # BLD AUTO: 1.92 K/UL
LYMPHOCYTES NFR BLD AUTO: 22.8 %
MAN DIFF?: NORMAL
MCHC RBC-ENTMCNC: 25.6 PG
MCHC RBC-ENTMCNC: 29.4 GM/DL
MCV RBC AUTO: 87.1 FL
MONOCYTES # BLD AUTO: 0.49 K/UL
MONOCYTES NFR BLD AUTO: 5.8 %
NEUTROPHILS # BLD AUTO: 5.81 K/UL
NEUTROPHILS NFR BLD AUTO: 69.1 %
NONHDLC SERPL-MCNC: 180 MG/DL
PLATELET # BLD AUTO: 274 K/UL
POTASSIUM SERPL-SCNC: 4 MMOL/L
PROT SERPL-MCNC: 7.6 G/DL
RBC # BLD: 4.65 M/UL
RBC # FLD: 14.7 %
SODIUM SERPL-SCNC: 140 MMOL/L
TRIGL SERPL-MCNC: 120 MG/DL
TSH SERPL-ACNC: 1.36 UIU/ML
WBC # FLD AUTO: 8.41 K/UL

## 2024-07-24 ENCOUNTER — APPOINTMENT (OUTPATIENT)
Dept: BARIATRICS | Facility: CLINIC | Age: 49
End: 2024-07-24
Payer: COMMERCIAL

## 2024-07-24 VITALS
TEMPERATURE: 96.8 F | DIASTOLIC BLOOD PRESSURE: 60 MMHG | OXYGEN SATURATION: 99 % | HEIGHT: 63.5 IN | HEART RATE: 98 BPM | WEIGHT: 149.47 LBS | BODY MASS INDEX: 26.16 KG/M2 | SYSTOLIC BLOOD PRESSURE: 115 MMHG

## 2024-07-24 DIAGNOSIS — R63.4 ABNORMAL WEIGHT LOSS: ICD-10-CM

## 2024-07-24 DIAGNOSIS — E66.3 OVERWEIGHT: ICD-10-CM

## 2024-07-24 PROCEDURE — G2211 COMPLEX E/M VISIT ADD ON: CPT

## 2024-07-24 PROCEDURE — 99214 OFFICE O/P EST MOD 30 MIN: CPT

## 2024-07-24 NOTE — HISTORY OF PRESENT ILLNESS
[de-identified] : 49 year old F with a longstanding history of obesity presents for a weight-loss medication follow-up. Pt currently on Wegovy 1mg after decreasing from Wegovy 1.7mg due to headaches; 1lb weight loss since last visit 1 month ago. Patient tolerating current dosage without adverse effects and  satisfied with levels of appetite suppression. Reports no abdominal pain, nausea/vomiting, constipation, diarrhea, reflux/heartburn, or vision loss. Pt is interested in continuing current dosage of medication   Patient trying to eat 3 well-balanced meals/day and reduced snacking (occasional fruits and nuts), drinking 2-3 glasses of water daily, and exercising by walking 10k steps daily; sleeps 7-10 hrs/night.   Starting weight at initial consult: 168lb Current weight at today's visit:149lb   Anti-obesity medication(s): Wegovy Start date: 10/27/24 Current dose: Wegovy 1mg Side-effects from anti-obesity medication(s): headaches on Wegovy 1.7mg Obesity comorbidities: none Comorbidities improved/resolved: n/a History of bariatric surgery: no

## 2024-07-24 NOTE — ASSESSMENT
[FreeTextEntry1] : 49 year old F with a longstanding history of obesity presents for a weight-loss medication follow-up. Pt currently on Wegovy 1mg after decreasing from Wegovy 1.7mg due to headaches; 1lb weight loss since last visit 1 month ago.  I had a lengthy discussion with the patient regarding nutrition, exercise, weight loss medications.   Patient will work on the following: -Eliminate snacks -Focus on eating 3 well-balanced meals during the daytime with appropriate portion size -Cooking fresh meals rather than take out/processed/ready-made foods -Increase hydation; Drink at least 64oz of zero-calorie liquids daily -Start daily multivitamin -Incorporating exercise; walk 8-10k steps daily; add cardio and strength training -Bloodwork done July 2024. Next labs to be checked Jan 2024. -Maintain at current dosage of Wegovy 1mg, 3 month supply sent.   As previously discussed, patient to avoid pregnancy while on Wegovy.   Pt will call office immediately for any side effects. Pt verbalizes understanding and wishes to proceed with medical therapy. Patient educated to call with questions/concerns. All questions answered.   Return to office in 3 months   Additional time spent before and after visit reviewing chart.

## 2024-07-24 NOTE — PHYSICAL EXAM
[Normal] : affect appropriate [de-identified] : Well, healthy-appearing adult [de-identified] :  Soft, NT, ND, no diastasis or hernias appreciated [de-identified] : NI

## 2024-08-05 ENCOUNTER — APPOINTMENT (OUTPATIENT)
Dept: OBGYN | Facility: CLINIC | Age: 49
End: 2024-08-05

## 2024-08-05 PROCEDURE — 99212 OFFICE O/P EST SF 10 MIN: CPT | Mod: 25

## 2024-08-05 PROCEDURE — 99386 PREV VISIT NEW AGE 40-64: CPT

## 2024-08-05 NOTE — HISTORY OF PRESENT ILLNESS
[Currently Active] : currently active [Men] : men [No] : No [FreeTextEntry1] :  49 year old LMP 7/6/24 presents to reestablish GYN care. She was previously a longstanding patient. Pt feels well and offers no complaints. Pt reports menses delayed by 2 weeks one time in April, but otherwise normal, regular menses. She denies intermenstrual bleeding, itching, burning, or abnormal discharge. Pt h/o HDL, managed with Wegovy, dietary changes, and exercise. Pt takes multivitamins, Vitamin D, and biotin. Advised to take zinc and quercetin.  [Mammogramdate] : 11/23 [PapSmeardate] : 01/20

## 2024-08-05 NOTE — COUNSELING
[Breast Self Exam] : breast self exam [Contraception/ Emergency Contraception/ Safe Sexual Practices] : contraception, emergency contraception, safe sexual practices [Nutrition/ Exercise/ Weight Management] : nutrition, exercise, weight management [Vitamins/Supplements] : vitamins/supplements

## 2024-08-05 NOTE — DISCUSSION/SUMMARY
[FreeTextEntry1] :  This note was written by Ellis Delaney on 08/05/2024 actively solely Dr. Sepideh Cárdenas M.D.   All medical record entries made by this scribe at my, Dr. Sepideh Cárdenas M.D. direction and personally dictated by me on 08/05/2024. I have personally reviewed the chart and agree that the record reflects my personal performance of the history, physical exam, assessment, and plan.

## 2024-08-05 NOTE — HISTORY OF PRESENT ILLNESS
[FreeTextEntry1] : 49 year old female P LMP 7/6/24 presents for annual gyn visit. Pt feels well and has no complaints. She has normal menses. She denies intermenstrual bleeding, itching, burning, or abnormal discharge [Currently Active] : currently active [Men] : men [No] : No

## 2024-08-05 NOTE — PHYSICAL EXAM
[Chaperone Present] : A chaperone was present in the examining room during all aspects of the physical examination [FreeTextEntry2] : Edil [Appropriately responsive] : appropriately responsive [Alert] : alert [No Acute Distress] : no acute distress [No Lymphadenopathy] : no lymphadenopathy [Regular Rate Rhythm] : regular rate rhythm [No Murmurs] : no murmurs [Clear to Auscultation B/L] : clear to auscultation bilaterally [Soft] : soft [Non-tender] : non-tender [Non-distended] : non-distended [No HSM] : No HSM [No Lesions] : no lesions [No Mass] : no mass [Oriented x3] : oriented x3 [Examination Of The Breasts] : a normal appearance [No Discharge] : no discharge [No Masses] : no breast masses were palpable [Labia Majora] : normal [Labia Minora] : normal [Normal] : normal [Uterine Adnexae] : normal [No Tenderness] : no tenderness [Nl Sphincter Tone] : normal sphincter tone [FreeTextEntry9] : Confirmed vaginal exam

## 2024-08-05 NOTE — PHYSICAL EXAM
[Chaperone Present] : A chaperone was present in the examining room during all aspects of the physical examination [Appropriately responsive] : appropriately responsive [Alert] : alert [No Acute Distress] : no acute distress [No Lymphadenopathy] : no lymphadenopathy [Regular Rate Rhythm] : regular rate rhythm [No Murmurs] : no murmurs [Clear to Auscultation B/L] : clear to auscultation bilaterally [Soft] : soft [Non-tender] : non-tender [Non-distended] : non-distended [No HSM] : No HSM [No Lesions] : no lesions [No Mass] : no mass [Oriented x3] : oriented x3 [Examination Of The Breasts] : a normal appearance [No Masses] : no breast masses were palpable [Labia Majora] : normal [Labia Minora] : normal [Normal] : normal [Uterine Adnexae] : normal [No Tenderness] : no tenderness [Nl Sphincter Tone] : normal sphincter tone [FreeTextEntry2] : Марина [Normal Position] : in a normal position [Tenderness] : nontender [Enlarged ___ wks] : not enlarged [Mass ___ cm] : no uterine mass was palpated [FreeTextEntry9] : Confirmed vaginal exam

## 2024-08-05 NOTE — PLAN
Message given to CHF nurses.   
[FreeTextEntry1] : - Referrals for mammo/sono given today. - Pap/HPV done today.  RTO 1 year for annual gyn exam.

## 2024-08-05 NOTE — PLAN
[FreeTextEntry1] : - Referral for mammo given today. - Referral given for TVS.  - Pap/HPV done today. - RTO 1 year for annual gyn exam.

## 2024-08-16 ENCOUNTER — APPOINTMENT (OUTPATIENT)
Dept: OBGYN | Facility: CLINIC | Age: 49
End: 2024-08-16

## 2024-10-01 ENCOUNTER — APPOINTMENT (OUTPATIENT)
Dept: BARIATRICS | Facility: CLINIC | Age: 49
End: 2024-10-01
Payer: COMMERCIAL

## 2024-10-01 DIAGNOSIS — R51.9 HEADACHE, UNSPECIFIED: ICD-10-CM

## 2024-10-01 DIAGNOSIS — E66.3 OVERWEIGHT: ICD-10-CM

## 2024-10-01 DIAGNOSIS — R63.4 ABNORMAL WEIGHT LOSS: ICD-10-CM

## 2024-10-01 PROCEDURE — 99214 OFFICE O/P EST MOD 30 MIN: CPT

## 2024-10-01 NOTE — REASON FOR VISIT
[Home] : at home, [unfilled] , at the time of the visit. [Medical Office: ([facility])___] : at the medical office located in  [Patient] : the patient [Self] : self [Follow-Up Visit] : a follow-up visit for [Other___] : [unfilled]

## 2024-10-01 NOTE — REASON FOR VISIT
[Home] : at home, [unfilled] , at the time of the visit. [Medical Office: ([facility])___] : at the medical office located in  [Patient] : the patient [Self] : self [Follow-Up Visit] : a follow-up visit for [Other___] : [unfilled]

## 2024-10-10 NOTE — PHYSICAL EXAM
[Normal] : affect appropriate [de-identified] : Well, healthy-appearing adult [de-identified] : deferred due to telehealth

## 2024-10-10 NOTE — HISTORY OF PRESENT ILLNESS
[de-identified] : 49-year-old F with a longstanding history of obesity presents for a weight-loss medication follow-up. Pt currently on Wegovy 1mg; 2 lb weight loss since last visit 2 months ago. Patient tolerating current dosage without adverse effects and reports excellent appetite suppression (pt had terrible headaches on Wegovy 1.7 mg/week which have since resolved since lowering Wegovy dose to 1mg/week). Reports no abdominal pain, nausea/vomiting, constipation, diarrhea, reflux/heartburn, or vision loss. Pt is interested in continuing current dosage of medication   Patient eating 3 protein-rich meals/day, drinking adequate zero-calorie fluids/day, and exercising.    Starting weight at initial consult: 168 lbs (heaviest weight before weight loss medications was 181 lbs) Current weight at today's visit:147 lbs   Anti-obesity medication(s): Wegovy Start date: 10/27/24 Current dose: Wegovy 1mg/week Side-effects from anti-obesity medication(s): none on Wegovy 1 mg (pt had terrible headaches on Wegovy 1.7mg) Obesity comorbidities: none Comorbidities improved/resolved: n/a History of bariatric surgery: no

## 2024-10-10 NOTE — PHYSICAL EXAM
[Normal] : affect appropriate [de-identified] : Well, healthy-appearing adult [de-identified] : deferred due to telehealth

## 2024-10-10 NOTE — ASSESSMENT
[FreeTextEntry1] : 49-year-old F with a longstanding history of obesity presents for a weight-loss medication follow-up. Pt currently on Wegovy 1mg; 2 lb weight loss since last visit 2 months ago. Patient tolerating current dosage without adverse effects and reports excellent appetite suppression (pt had terrible headaches on Wegovy 1.7 mg/week which have since resolved since lowering Wegovy dose to 1mg/week).  I had a lengthy discussion with the patient regarding nutrition, exercise, weight loss medications.   Patient will work on the following:  Eliminate any after dinner snacking (consider drinking water instead; can add fruit like Watermelon, Cucumber, Lemon to water)  Focus on eating 3 well-balanced meals during the daytime with appropriate portion size Cooking fresh meals rather than take out/processed/ready-made foods Try to make vegetables the largest portion of each meal, followed by a lean protein (e.g. lentils, chicken, fish), and finally a complex carbohydrate if still hungry (e.g. sweet potato, farro, quinoa)  Increase hydation; Drink at least 64oz of zero-calorie liquids daily Continue daily multivitamin Incorporating exercise; walk 8-10k steps daily; add cardio and strength training Bloodwork done July 2024. Next labs to be checked Jan 2024. Maintain at current dosage of Wegovy 1mg, 3 month supply sent.   As previously discussed, patient to avoid pregnancy while on Wegovy and to stop Wegovy immediately if becomes pregnant   Pt will call office immediately for any side effects. Pt verbalizes understanding and wishes to proceed with medical therapy. Patient educated to call with questions/concerns. All questions answered.   Return to office in 3 months   Additional time spent before and after visit reviewing chart.

## 2024-10-10 NOTE — HISTORY OF PRESENT ILLNESS
[de-identified] : 49-year-old F with a longstanding history of obesity presents for a weight-loss medication follow-up. Pt currently on Wegovy 1mg; 2 lb weight loss since last visit 2 months ago. Patient tolerating current dosage without adverse effects and reports excellent appetite suppression (pt had terrible headaches on Wegovy 1.7 mg/week which have since resolved since lowering Wegovy dose to 1mg/week). Reports no abdominal pain, nausea/vomiting, constipation, diarrhea, reflux/heartburn, or vision loss. Pt is interested in continuing current dosage of medication   Patient eating 3 protein-rich meals/day, drinking adequate zero-calorie fluids/day, and exercising.    Starting weight at initial consult: 168 lbs (heaviest weight before weight loss medications was 181 lbs) Current weight at today's visit:147 lbs   Anti-obesity medication(s): Wegovy Start date: 10/27/24 Current dose: Wegovy 1mg/week Side-effects from anti-obesity medication(s): none on Wegovy 1 mg (pt had terrible headaches on Wegovy 1.7mg) Obesity comorbidities: none Comorbidities improved/resolved: n/a History of bariatric surgery: no

## 2024-10-30 NOTE — HISTORY OF PRESENT ILLNESS
[de-identified] : 48 year old woman presents for weight loss medication follow-up. Patient is on Wegovy 0.25 mg; reports no side effects.  Patient feels medication is helping her eat less.  Patient is trying to make better food choices.  Still struggling with kitchen renovation and being unable to cook.  Reports no abdominal pain, nausea/vomiting, constipation, diarrhea, reflux/heartburn. Patient eating 2 protein-rich meals/day, drinking adequate zero-calorie fluids/day, and exercising. \par \par History of bariatric surgery: None\par Obesity comorbidities: None\par Comorbidities improved/resolved: N/A\par Anti-obesity medication: Wegovy\par Obesity medication side effects: None\par \par Last nutritionist appt. was 6/28/23
supervision/verbal cues/nonverbal cues (demo/gestures)

## 2024-11-06 ENCOUNTER — APPOINTMENT (OUTPATIENT)
Dept: MAMMOGRAPHY | Facility: IMAGING CENTER | Age: 49
End: 2024-11-06
Payer: COMMERCIAL

## 2024-11-06 ENCOUNTER — RESULT REVIEW (OUTPATIENT)
Age: 49
End: 2024-11-06

## 2024-11-06 ENCOUNTER — OUTPATIENT (OUTPATIENT)
Dept: OUTPATIENT SERVICES | Facility: HOSPITAL | Age: 49
LOS: 1 days | End: 2024-11-06
Payer: COMMERCIAL

## 2024-11-06 DIAGNOSIS — Z01.419 ENCOUNTER FOR GYNECOLOGICAL EXAMINATION (GENERAL) (ROUTINE) WITHOUT ABNORMAL FINDINGS: ICD-10-CM

## 2024-11-06 PROCEDURE — 77067 SCR MAMMO BI INCL CAD: CPT | Mod: 26

## 2024-11-06 PROCEDURE — 77063 BREAST TOMOSYNTHESIS BI: CPT | Mod: 26

## 2024-11-06 PROCEDURE — 77063 BREAST TOMOSYNTHESIS BI: CPT

## 2024-11-06 PROCEDURE — 77067 SCR MAMMO BI INCL CAD: CPT

## 2024-12-16 ENCOUNTER — TRANSCRIPTION ENCOUNTER (OUTPATIENT)
Age: 49
End: 2024-12-16

## 2024-12-17 ENCOUNTER — TRANSCRIPTION ENCOUNTER (OUTPATIENT)
Age: 49
End: 2024-12-17

## 2025-02-10 ENCOUNTER — NON-APPOINTMENT (OUTPATIENT)
Age: 50
End: 2025-02-10

## 2025-02-10 ENCOUNTER — APPOINTMENT (OUTPATIENT)
Dept: BARIATRICS | Facility: CLINIC | Age: 50
End: 2025-02-10
Payer: COMMERCIAL

## 2025-02-10 VITALS
TEMPERATURE: 97.9 F | BODY MASS INDEX: 26.74 KG/M2 | OXYGEN SATURATION: 97 % | SYSTOLIC BLOOD PRESSURE: 134 MMHG | HEART RATE: 95 BPM | DIASTOLIC BLOOD PRESSURE: 85 MMHG | WEIGHT: 152.78 LBS | HEIGHT: 63.5 IN

## 2025-02-10 DIAGNOSIS — Z79.899 OTHER LONG TERM (CURRENT) DRUG THERAPY: ICD-10-CM

## 2025-02-10 DIAGNOSIS — E46 UNSPECIFIED PROTEIN-CALORIE MALNUTRITION: ICD-10-CM

## 2025-02-10 DIAGNOSIS — Z01.812 ENCOUNTER FOR PREPROCEDURAL LABORATORY EXAMINATION: ICD-10-CM

## 2025-02-10 DIAGNOSIS — E66.3 OVERWEIGHT: ICD-10-CM

## 2025-02-10 DIAGNOSIS — Z76.0 ENCOUNTER FOR ISSUE OF REPEAT PRESCRIPTION: ICD-10-CM

## 2025-02-10 DIAGNOSIS — Z32.00 ENCOUNTER FOR PREGNANCY TEST, RESULT UNKNOWN: ICD-10-CM

## 2025-02-10 DIAGNOSIS — Z00.00 ENCOUNTER FOR GENERAL ADULT MEDICAL EXAMINATION W/OUT ABNORMAL FINDINGS: ICD-10-CM

## 2025-02-10 DIAGNOSIS — Z13.220 ENCOUNTER FOR SCREENING FOR LIPOID DISORDERS: ICD-10-CM

## 2025-02-10 DIAGNOSIS — R79.9 ABNORMAL FINDING OF BLOOD CHEMISTRY, UNSPECIFIED: ICD-10-CM

## 2025-02-10 PROCEDURE — 99214 OFFICE O/P EST MOD 30 MIN: CPT

## 2025-02-28 ENCOUNTER — TRANSCRIPTION ENCOUNTER (OUTPATIENT)
Age: 50
End: 2025-02-28

## 2025-05-06 ENCOUNTER — APPOINTMENT (OUTPATIENT)
Dept: BARIATRICS | Facility: CLINIC | Age: 50
End: 2025-05-06

## 2025-05-13 ENCOUNTER — APPOINTMENT (OUTPATIENT)
Dept: BARIATRICS | Facility: CLINIC | Age: 50
End: 2025-05-13
Payer: COMMERCIAL

## 2025-05-13 VITALS — WEIGHT: 148 LBS | HEIGHT: 63.5 IN | BODY MASS INDEX: 25.9 KG/M2

## 2025-05-13 DIAGNOSIS — E78.5 HYPERLIPIDEMIA, UNSPECIFIED: ICD-10-CM

## 2025-05-13 DIAGNOSIS — E66.3 OVERWEIGHT: ICD-10-CM

## 2025-05-13 DIAGNOSIS — E56.9 VITAMIN DEFICIENCY, UNSPECIFIED: ICD-10-CM

## 2025-05-13 DIAGNOSIS — R63.2 POLYPHAGIA: ICD-10-CM

## 2025-05-13 DIAGNOSIS — Z79.899 OTHER LONG TERM (CURRENT) DRUG THERAPY: ICD-10-CM

## 2025-05-13 DIAGNOSIS — E61.8 DEFICIENCY OF OTHER SPECIFIED NUTRIENT ELEMENTS: ICD-10-CM

## 2025-05-13 PROCEDURE — 99214 OFFICE O/P EST MOD 30 MIN: CPT | Mod: 95

## 2025-08-05 ENCOUNTER — APPOINTMENT (OUTPATIENT)
Dept: OBGYN | Facility: CLINIC | Age: 50
End: 2025-08-05
Payer: COMMERCIAL

## 2025-08-05 VITALS
SYSTOLIC BLOOD PRESSURE: 112 MMHG | WEIGHT: 151 LBS | BODY MASS INDEX: 26.42 KG/M2 | DIASTOLIC BLOOD PRESSURE: 76 MMHG | HEIGHT: 63.5 IN | HEART RATE: 91 BPM

## 2025-08-05 DIAGNOSIS — Z01.419 ENCOUNTER FOR GYNECOLOGICAL EXAMINATION (GENERAL) (ROUTINE) W/OUT ABNORMAL FINDINGS: ICD-10-CM

## 2025-08-05 PROCEDURE — 99396 PREV VISIT EST AGE 40-64: CPT

## 2025-09-10 ENCOUNTER — TRANSCRIPTION ENCOUNTER (OUTPATIENT)
Age: 50
End: 2025-09-10